# Patient Record
Sex: FEMALE | Race: WHITE
[De-identification: names, ages, dates, MRNs, and addresses within clinical notes are randomized per-mention and may not be internally consistent; named-entity substitution may affect disease eponyms.]

---

## 2022-05-26 ENCOUNTER — NON-APPOINTMENT (OUTPATIENT)
Age: 57
End: 2022-05-26

## 2022-05-27 PROBLEM — Z00.00 ENCOUNTER FOR PREVENTIVE HEALTH EXAMINATION: Status: ACTIVE | Noted: 2022-05-27

## 2022-05-31 ENCOUNTER — TRANSCRIPTION ENCOUNTER (OUTPATIENT)
Age: 57
End: 2022-05-31

## 2022-05-31 ENCOUNTER — APPOINTMENT (OUTPATIENT)
Dept: NEUROSURGERY | Facility: CLINIC | Age: 57
End: 2022-05-31
Payer: COMMERCIAL

## 2022-05-31 PROCEDURE — 99205 OFFICE O/P NEW HI 60 MIN: CPT

## 2022-05-31 NOTE — PHYSICAL EXAM
[General Appearance - Alert] : alert [General Appearance - In No Acute Distress] : in no acute distress [General Appearance - Well Nourished] : well nourished [Oriented To Time, Place, And Person] : oriented to person, place, and time [Impaired Insight] : insight and judgment were intact [Affect] : the affect was normal [Cranial Nerves Trigeminal (V)] : facial sensation intact symmetrically [Cranial Nerves Facial (VII)] : face symmetrical [Cranial Nerves Vestibulocochlear (VIII)] : hearing was intact bilaterally [Cranial Nerves Glossopharyngeal (IX)] : tongue and palate midline [Cranial Nerves Accessory (XI - Cranial And Spinal)] : head turning and shoulder shrug symmetric [Cranial Nerves Hypoglossal (XII)] : there was no tongue deviation with protrusion [Motor Tone] : muscle tone was normal in all four extremities [Motor Strength] : muscle strength was normal in all four extremities [Sensation Tactile Decrease] : light touch was intact [Abnormal Walk] : normal gait [Balance] : balance was intact [Cranial Nerves Optic (II)] : visual acuity intact bilaterally,  pupils equal round and reactive to light [Cranial Nerves Oculomotor (III)] : extraocular motion intact [FreeTextEntry6] : Bilateral hand tremor

## 2022-05-31 NOTE — ASSESSMENT
[FreeTextEntry1] : Ms. Mendoza presents with the acute onset of right periorbital eye pain exacerbated with eye movement which resolved with methylprednisolone, and more recent onset of less severe, paroxysmal, self limited left periorbital pain. MRI brain/orbits with and without gadolinium 5/2/22 and MRA/V brain 5/17/22 reviewed independently by me demonstrate several subtle findings. There is a subcentimeter left intraconal orbital lesion that likely represents a varix of the left inferior orbital vein, but may represent a hemangioma. There is a tiny aneurysm versus infundibulum of the origin of the left ophthalmic artery. MRV suggests focal stenoses versus arachnoid granulations of bilateral transverse-sigmoid sinus junctions. \par \par Natural history discussed at length with the patient and her wife.  Alternative management strategies reviewed. I explained that diagnostic cerebral angiography may be able to offer more detailed and accurate information regarding the possible left intraorbital venous anomaly, the possible left ophthalmic artery origin infundibulum versus aneurysm, and the venous outflow at the level of the bilateral transverse-sigmoid junctions. I also explained that a reasonable approach would be to obtain surveillance MRI orbits with and without gadolinium, MRA brain in 6  months given normal visual acuity and absence of optic nerve pathology. She would prefer to proceed with diagnostic angiography.\par \par Risks including but not limited to bleeding, infection, vascular injury, life threatening hematoma, limb ischemia, need for surgical repair, renal failure, stroke, coma/death, weakness/paralysis, visual/sensory loss, loss of speech/language/cognitive/memory function, changes in personality/behavior, failure of procedure, need for additional procedures discussed. The patient and her wife understand the risks, benefits, alternatives, and they wish to proceed. As such, we will schedule her for diagnostic cerebral angiogram with conscious sedation in the near future. \par \par I have asked the patient and her wife to contact me for any symptomatic development or progression in the interim at which time we can obtain expedited follow up imaging.\par \par A total of 60  minutes were spent relative to this encounter.

## 2022-05-31 NOTE — HISTORY OF PRESENT ILLNESS
[de-identified] : Ms. Mendoza presents in neurosurgical consultation at the request of Dr. Casie Hernandez with her wife in attendance. She has PMHx of CAD on ASA, chronic left hand tremor, right hand tremor (began 6 months ago), endometriosis, depression, kidney stones, and hypercholesterolemia. In April, patient reports the sudden onset of right eye severe periorbital pain which awakened her from sleep. Pain had been constant and was exacerbated with eye movement. Patient was evaluated in the emergency room at [facility]. As per the patient, CT revealed no acute findings. We do not have these images. Denies loss of vision. She was then evaluated in the outpatient setting by ophthalmologist, Dr. Abraham Torre, who requested MRI orbits and MRA/V brain (detailed below). She was treated empirically for retrobulbar optic neuritis with methylprednisolone with resolution of symptoms. Of note, ophthalmologic evaluations under the direction of both Dr. Torre and Dr. Hernandez demonstrated no loss of visual acuity and no overt evidence for optic disc pathology or papilledema. \par \par Today patient states alleviation of right periorbital pain since completion of methylprednisolone. Reports occasional 'dullness', but no recurrence of initial pain symptoms. Patient endorses new onset of left "stabbing" periorbital pain, which lasts for approximately 20 seconds and is less severe than original right sided symptoms. This has occurred each of the last three days, and has been self limited, resolving without medication.\par \par Denies headaches, loss of vision, diplopia, numbness/tingling in upper and lower extremities, and gait disturbances.

## 2022-05-31 NOTE — DATA REVIEWED
[de-identified] : MRA BRAIN: 5/17/22: IMPRESSION: Impression: A 2 mm vascular protrusion arising from the left anterior \par intracranial internal carotid artery, which may represent volume averaging \par artifact or the infundibulum of the left ophthalmic artery but the possibility \par of a small aneurysm cannot be excluded. A follow-up CTA is recommended for \par further evaluation. \par   MRV BRAIN: 5/17/22: IMPRESSION: Impression: \par   \par 1. Narrowing at the junction of the bilateral transverse and sigmoid sinuses, \par which may be secondary to arachnoid granulations or stenosis. A follow-up MRI of \par the brain without and with contrast (IAC protocol) and may be performed for \par further evaluation. \par 2. The left inferior orbital vein appears slightly more prominent when compared \par to the right, which may corresponds to the finding seen on the MRI orbit \par examination.  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 Grossly unremarkable right orbit. If clinically indicated, a \par follow-up MRI in 6-12 months may be performed to assess for stability.

## 2022-06-22 ENCOUNTER — APPOINTMENT (OUTPATIENT)
Dept: NEUROLOGY | Facility: CLINIC | Age: 57
End: 2022-06-22
Payer: COMMERCIAL

## 2022-06-22 VITALS
DIASTOLIC BLOOD PRESSURE: 83 MMHG | WEIGHT: 150 LBS | BODY MASS INDEX: 25.61 KG/M2 | HEIGHT: 64 IN | HEART RATE: 60 BPM | SYSTOLIC BLOOD PRESSURE: 127 MMHG

## 2022-06-22 DIAGNOSIS — E78.5 HYPERLIPIDEMIA, UNSPECIFIED: ICD-10-CM

## 2022-06-22 DIAGNOSIS — E55.9 VITAMIN D DEFICIENCY, UNSPECIFIED: ICD-10-CM

## 2022-06-22 DIAGNOSIS — Z87.891 PERSONAL HISTORY OF NICOTINE DEPENDENCE: ICD-10-CM

## 2022-06-22 DIAGNOSIS — Z78.9 OTHER SPECIFIED HEALTH STATUS: ICD-10-CM

## 2022-06-22 DIAGNOSIS — Z82.0 FAMILY HISTORY OF EPILEPSY AND OTHER DISEASES OF THE NERVOUS SYSTEM: ICD-10-CM

## 2022-06-22 DIAGNOSIS — Z82.3 FAMILY HISTORY OF STROKE: ICD-10-CM

## 2022-06-22 DIAGNOSIS — F32.A DEPRESSION, UNSPECIFIED: ICD-10-CM

## 2022-06-22 PROCEDURE — 99204 OFFICE O/P NEW MOD 45 MIN: CPT

## 2022-06-22 NOTE — REASON FOR VISIT
[Consultation] : a consultation visit [FreeTextEntry1] : Pt states she has been experiencing memory issues, confusions, and hand tremors. She also states she has been experiencing numbness on her fingertips and extreme fatigue.

## 2022-06-22 NOTE — HISTORY OF PRESENT ILLNESS
[FreeTextEntry1] : Jeanna is a 57-year-old right-handed woman with a past medical history of CAD, endometriosis, depression, kidney stones, hypercholesterolemia. She was referred by Dr. Salazar. She has recent history of severe right eye pain with no papillitis on fundoscopic exam. \par \par MRI brain did not show pathology of right optic nerve. She did have some vascular abnormalities noted to left ophthalmic artery as well as narrowing of the bilateral transverse to sigmoid sinus. She will undergo an angiogram with Dr. Salazar for this. \par \par Today she presents with a myriad of complaints. The most important problem is memory loss. She noticed this about two months ago. She is more forgetful. She will go into a room and forget why she was there. She has left her glasses in the car. She will forget the date or what year it is. Her deficits are noticeable to her wife. The most scary experience for her is that she will have moments where she forgets who she is and where she is. She is completely disoriented sometimes and has no awareness of even what country she is in . Episodes last seconds. Once she was speaking with friends and suddenly realized she had no idea who they were. She has intermittent jerking of her extremities as well. Sometimes she has no memory of who she is.\par \par She has a tremor which is troublesome bilaterally. She feels her balance is off, her fingertips are numb and that swallowing water is difficult. She has spatial difficulty and sometimes hears things that are not there, such as a cat's cry. \par \par No known history of seizures. \par \par She does have a history of  severe depression. She received ECT, over 50 treatments, bilateral, and thinks it may have destroyed her memory. \par \par Of note, she lost both parents in the past two years. She was the primary caretaker for both. \par \par Allergies: \par none\par \par Surgeries: \par hysterectomy 2000\par \par Medications: \par wellbutrin 300 mg XR daily\par paroxetine 20 mg qd \par ranolazine 50 mg bid \par lipitor 20 mg daily \par valium, 5 mg prn anxiety\par \par Social History\par Lives with wife\par Not working currently \par Used to be a writer\par College degree: BA  -creative writing\par Vegan diet\par Drinks a glass of wine with dinner  \par Quit smoking in 20s \par Did psychedelics, marijuana in high school \par Drives \par No children\par \par Family History: \par Mother:  - Parkinsons Disease, dementia\par Father: stroke, quadruple bypass in forties, many men in family  of heart attack in forties\par Sister: none known \par \par \par

## 2022-06-22 NOTE — PHYSICAL EXAM
[FreeTextEntry1] : Mental Status: alert to month, off on the day by 5 days, knows the year. Able to calculate number of quarters in $2.25. Difficulty with serial sevens. Able to spell “world” backwards. 3/3 registration of 3 items, 3/3 recall at 3 minutes. anxious, slightly flattened affect. Speech fluent\par CN: visual acuity, VFF, blink to confrontation \par III, IV, VI, PERRL, EOMI \par V sensation normal to light touch, pinprick\par VII normal squint vs resistance, normal smile, face symmetric \par VIII: normal hearing  \par IX, X normal gag, symmetric palate, uvula raises midline \par XI normal shrug versus resistance and lateralization of head versus resistance \par XII tongue symmetric, normal strength, no tremor fasciculations \par Motor: full strength throughout, normal bulk and tone, no cog-wheeling.  Sustention and action tremor, L>R \par Sensory: normal to LT and vibration \par Reflexes \par Brachioradialis, biceps, triceps 1+ bilaterally, patella and ankles 2+ \par Plantar flexor response bilaterally \par Coordination: no dysmetria on FNF  \par Gait : normal balance and gait, able to toe, heel, difficulty with tandem \par Station: negative Romberg  \par \par

## 2022-06-22 NOTE — REVIEW OF SYSTEMS
[Confused or Disoriented] : confusion [Memory Lapses or Loss] : memory loss [Numbness] : numbness [Negative] : Heme/Lymph [de-identified] : Hand tremors, and numbness on fingertips

## 2022-06-22 NOTE — ASSESSMENT
[FreeTextEntry1] : Please get me a copy of your EKG - before starting propranolol for essential tremor\par Please get neuropsychological testing\par Please get labwork\par Please get routine and ambulatory EEG\par angiogram with Dr. Salazar\par \par RTC in 2 months

## 2022-06-22 NOTE — DISCUSSION/SUMMARY
[FreeTextEntry1] : Jeanna is a 57-year-old right handed woman with a history of depression (received numerous ECT treatments in the past) and hyperlipidemia who is presenting to clinic for evaluation of memory issues. She had severe right eye pain (treated with methylprednisone) without evidence of optic neuritis. Symptoms resolved but left eye discomfort with question of left ophthalmic artery abnormality and sinus narrowing on MRA (outside study). Scheduled for angiogram with Dr. Salazar. \par \par She reports feeling fatigued with memory issues. Perhaps most concerning, she has episodes where she will forget suddenly where she is or forget the names of the friends she is speaking to. These sudden episodes of disorientation last seconds. Unclear if these are dissociative episodes related to depression (history of extensive ECT, suffered loss of both parents in past 2 years, she was primary caretaker) or if she has true memory and attention deficits. She states that sometimes she forgets who she is. This is not typical of dementia or seizures and may be more typical of a dissociative state. Vegan diet with increased MCV on labs. Will get b12, thyroid function tests. Will do formal neurocognitve testing, rule out pseudodementia/MCI. Has features of essential tremor - this is bothersome, does spill food sometimes. Findings not consistent with Parkinsons. Handwriting is also worse. Would not start zonisamide given possible worsening mood and history of kidney stones. No topamax given history of kidney stones. Could give propranolol but it may worsen depression. Will get an EKG. She has taken it in the past for public speaking. Will do EEG. If all findings are negative, would recommend therapy/grief counseling, vitamin b complex qd.

## 2022-06-22 NOTE — DATA REVIEWED
[de-identified] : Previous Radiographic/Studies: \par MRA: MRA BRAIN: 5/17/22: IMPRESSION: Impression: A 2 mm vascular protrusion arising from the left anterior \par intracranial internal carotid artery, which may represent volume averaging \par artifact or the infundibulum of the left ophthalmic artery but the possibility \par of a small aneurysm cannot be excluded. A follow-up CTA is recommended for \par further evaluation. \par  MRV BRAIN: 5/17/22: IMPRESSION: Impression: \par  \par 1. Narrowing at the junction of the bilateral transverse and sigmoid sinuses, \par which may be secondary to arachnoid granulations or stenosis. A follow-up MRI of \par the brain without and with contrast (IAC protocol) and may be performed for \par further evaluation. \par 2. The left inferior orbital vein appears slightly more prominent when compared \par to the right, which may corresponds to the finding seen on the MRI orbit \par examination. 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 Grossly unremarkable right orbit. If clinically indicated, a \par follow-up MRI in 6-12 months may be performed to assess for stability  [de-identified] : 6/2022: hemoglobin 12.3, .6, creatinine 0.65, LFT WNL

## 2022-06-23 DIAGNOSIS — R41.9 UNSPECIFIED SYMPTOMS AND SIGNS INVOLVING COGNITIVE FUNCTIONS AND AWARENESS: ICD-10-CM

## 2022-06-23 DIAGNOSIS — E55.9 VITAMIN D DEFICIENCY, UNSPECIFIED: ICD-10-CM

## 2022-06-23 LAB
25(OH)D3 SERPL-MCNC: 13.9 NG/ML
TSH SERPL-ACNC: 1.33 UIU/ML
VIT B12 SERPL-MCNC: 223 PG/ML

## 2022-06-29 ENCOUNTER — RESULT REVIEW (OUTPATIENT)
Age: 57
End: 2022-06-29

## 2022-06-29 ENCOUNTER — APPOINTMENT (OUTPATIENT)
Dept: NEUROSURGERY | Facility: HOSPITAL | Age: 57
End: 2022-06-29

## 2022-06-29 DIAGNOSIS — G93.9 DISORDER OF BRAIN, UNSPECIFIED: ICD-10-CM

## 2022-06-29 PROCEDURE — ZZZZZ: CPT

## 2022-07-05 ENCOUNTER — APPOINTMENT (OUTPATIENT)
Dept: NEUROSURGERY | Facility: CLINIC | Age: 57
End: 2022-07-05

## 2022-07-05 PROCEDURE — 99215 OFFICE O/P EST HI 40 MIN: CPT

## 2022-07-05 NOTE — ASSESSMENT
[FreeTextEntry1] : Ms. Mendoza presents today status post diagnostic cerebral angiogram. Diagnostic angiogram 6/29/22 reviewed independently by me demonstrates no evidence for intraorbital vascular malformation, no evidence for aneurysm, and no evidence for hemodynamically significant venous sinus stenosis. This is essentially a normal diagnostic angiogram. \par \par While angiography was normal, I was unable to find a definitive angiographic correlate for the small intraorbital lesion that likely represents a venous anomaly, but may less likely represent a hemangioma. As such, I plan to obtain MRI brain and orbits with and without gadolinium in 1 year with an appointment to follow. If this next study is unchanged, then I do not think that scheduled surveillance imaging will be indicated at that point. \par \par I have asked the patient and her wife to contact me for any symptomatic development or progression in the interim at which time we can obtain expedited follow up imaging.\par \par A total of 45 minutes were spent relative to this encounter.\par \par  \par \par

## 2022-07-05 NOTE — HISTORY OF PRESENT ILLNESS
[FreeTextEntry1] : Ms. Mendoza presents status post diagnostic cerebral angiogram 6/29/22. She denies neurological symptoms. Previous notes and interval history reviewed. She denies reoccurrence of initial symptoms. Forgetfulness and hand tremors are being managed under the direction of Dr. Puente. Neurological examination remains normal.\par

## 2022-07-07 ENCOUNTER — APPOINTMENT (OUTPATIENT)
Dept: NEUROLOGY | Facility: CLINIC | Age: 57
End: 2022-07-07

## 2022-08-31 ENCOUNTER — APPOINTMENT (OUTPATIENT)
Dept: NEUROLOGY | Facility: CLINIC | Age: 57
End: 2022-08-31

## 2022-08-31 VITALS
DIASTOLIC BLOOD PRESSURE: 82 MMHG | SYSTOLIC BLOOD PRESSURE: 130 MMHG | OXYGEN SATURATION: 97 % | TEMPERATURE: 97.7 F | HEIGHT: 64 IN | BODY MASS INDEX: 25.61 KG/M2 | WEIGHT: 150 LBS

## 2022-08-31 DIAGNOSIS — Z98.890 OTHER SPECIFIED POSTPROCEDURAL STATES: ICD-10-CM

## 2022-08-31 PROCEDURE — 99215 OFFICE O/P EST HI 40 MIN: CPT

## 2022-08-31 NOTE — DATA REVIEWED
[de-identified] : Previous Radiographic/Studies: \par MRA: MRA BRAIN: 5/17/22: IMPRESSION: Impression: A 2 mm vascular protrusion arising from the left anterior \par intracranial internal carotid artery, which may represent volume averaging \par artifact or the infundibulum of the left ophthalmic artery but the possibility \par of a small aneurysm cannot be excluded. A follow-up CTA is recommended for \par further evaluation. \par  MRV BRAIN: 5/17/22: IMPRESSION: Impression: \par  \par 1. Narrowing at the junction of the bilateral transverse and sigmoid sinuses, \par which may be secondary to arachnoid granulations or stenosis. A follow-up MRI of \par the brain without and with contrast (IAC protocol) and may be performed for \par further evaluation. \par 2. The left inferior orbital vein appears slightly more prominent when compared \par to the right, which may corresponds to the finding seen on the MRI orbit \par examination. 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 Grossly unremarkable right orbit. If clinically indicated, a \par follow-up MRI in 6-12 months may be performed to assess for stability  [de-identified] : 6/2022: hemoglobin 12.3, .6, creatinine 0.65, LFT WNL  \par 6/2022: vitamin b12 223 low, vitamin D 13.9 low , tsh 1.33 \par EKG 6/2022: sinus bradycardia, CA interval 138, QRS 84 heart rate 56 \par

## 2022-08-31 NOTE — ASSESSMENT
[FreeTextEntry1] : Can take magnesium oxide 400 mg at night for insomnia\par \par Continue vitamin b12 - have levels checked, if they are normal \par (can do weekly until deficiency corrected and then monthly) or transition to po 1000 mcg qd \par \par Continue vitamin D - have levels checked, goal > 30 , ideal 50-60 - once level is good, transition to vitamin d 1000 \par units or 2000 untis daily. Don't want to take high dose vitamin d indefinitely, can get toxicity\par \par Will send propranolol 40 mg twice a day to pharmacy for tremor - if you get dizzy - stop it (you have low heart rate at baseline) \par \par RTC to clinic in four months to see Dr. Karla Puente

## 2022-08-31 NOTE — PHYSICAL EXAM
[FreeTextEntry1] : Mental Status:  Off on the day by 1. 3/3 registration of 3 items, 3/3 at 3 minutes. off on the day by 1. \par Speech fluent. Anxious affect. \par CN: visual acuity, VFF, blink to confrontation \par III, IV, VI, PERRL, EOMI \par V sensation normal to light touch, pinprick\par VII normal squint vs resistance, normal smile, face symmetric \par VIII: normal hearing  \par IX, X normal gag, symmetric palate, uvula raises midline \par XI normal shrug versus resistance and lateralization of head versus resistance \par XII tongue symmetric, normal strength, no tremor fasciculations \par Motor: full strength throughout, normal bulk and tone, no cog-wheeling.  Sustention and action tremor, L>R \par Poor handwriting, and spiral drawing with tremor \par Sensory: normal to LT  \par Coordination: no dysmetria on FNF  \par Gait : normal balance and gait \par \par \par

## 2022-08-31 NOTE — HISTORY OF PRESENT ILLNESS
[FreeTextEntry1] : Presenting to clinic with wife. Feels much better, cognitively sharper and moodwise after b12 repletion. Has a vegan diet. Receiving vitamin b12 injections once a week for ~2 months. Also taking high dose vitamin D. Overall mood is improved. Does not think she needs to cognitive testing at this time. \par \par Reports having bothersome tremor. Started in left hand and is now in right hand. It is interfering with life. Tries to eat soup and will spill. Cannot write anymore. Would like medication.  \par \par Medications: \par bupropion 300 mg XR daily\par paroxetine 20 mg qd \par ranolazine 50 mg bid \par atorvastatin 20 mg daily \par valium, 5 mg prn anxiety\par \par _______\par 2022 \par Jeanna is a 57-year-old right-handed woman with a past medical history of CAD, endometriosis, depression, kidney stones, hypercholesterolemia. She was referred by Dr. Salazar. She has recent history of severe right eye pain with no papillitis on fundoscopic exam. \par \par MRI brain did not show pathology of right optic nerve. She did have some vascular abnormalities noted to left ophthalmic nerve as well as narrowing of bilateral transverse and sigmoid sinuses. She will undergo an angiogram with Dr. Salazar for this. \par \par Today she presents with a myriad of complaints. The most important problem is memory loss. She noticed this about two months ago. She is more forgetful. She will go into a room and forget why she was there. She has left her glasses \par in the car. She will forget the date, or what year it is. Her deficits are noticeable to her wife. The most scary experience for her is that she will have moments where she forgets who she is and where she is. She is completely disoriented and sometimes and has no awareness of even what country she is in . Episodes last seconds. Once she was speaking with friends and suddenly realized she had no idea who they were. She has intermittent jerking of her extremities as well. \par \par She has a tremor which is troublesome bilaterally. She feels her balance is off, her fingertips are numb and that swallowing water is difficult. She has spatial difficulty and sometimes hears things that are not there, such as a cat's cry. \par \par No known history of seizures. \par \par She does have a history of  severe depression. She received ECT, over 50 treatments, bilateral, and thinks it may have destroyed her memory. \par \par Of note, she lost both parents in the past two years. She was the primary caretaker for both. \par \par Allergies: \par none\par \par Surgeries: \par hysterectomy \par \par Medications: \par wellbutrin 300 mg XR daily\par paroxetine 20 mg qd \par ranozaline 50 mg bid \par lipitor 20 mg daily \par valium, 5 mg prn anxiety\par \par Social History\par Lives with wife\par Not working currently \par Used to be a writer\par College degree: BA  -creative writing\par Vegan diet\par Drinks a glass of wine with dinner  \par Quit smoking in 20s \par Did psychedelics, marijuana in high school \par Drives \par No children\par \par Family History: \par Mother:  - Parkinsons Disease, dementia\par Father: stroke, quadruple bypass in forties, many men in family  of heart attack in forties\par Sister: none known \par \par \par

## 2022-08-31 NOTE — DISCUSSION/SUMMARY
[FreeTextEntry1] : Jeanna is a 57-year-old right handed woman with a history of depression (received numerous ECT treatments in the past) and hyperlipidemia who is presenting to clinic for evaluation of memory issues. She had severe right eye pain (treated with methylprednisone) without evidence of optic neuritis. Symptoms resolved but left eye discomfort with question of left ophthalmic artery abnormality and sinus narrowing on MRA (outside study). She had an angiogram 6/29/22 - this was normal. She had a small itnraorbital lesion on MRI that was not visualized on angiogram. Will repeat MRI brain/orbits w/wo in 1 year , rule out venous anomaly or hemangioma. \par \par She reports feeling fatigued with memory issues. Perhaps most concerning, she has episodes where she will forget suddenly where she is or forget the names of the friends she is speaking to. These sudden episodes of disorientation last seconds. Unclear if these are dissociative episodes related to depression (history of extensive ECT, suffered loss of both parents in past 2 years, she was primary caretaker) or if she has true memory and attention deficits. She states that sometimes she forgets who she is. This is not typical of dementia or seizures and may be more typical of a dissociative state. Vegan diet with increased MCV on labs. B12 was low  - she feels much better now that this is being repleted. Receiving IM injections. Also getting supplementation with vitamin D. \par \danyelle Has features of essential tremor - this is bothersome, does spill food sometimes. Findings not consistent with Parkinsons. Handwriting is also worse. Would not start zonisamide given possible worsening mood and history of kidney stones. 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 Warned that it may worsen depression. She has taken it in the past for public speaking. Requested ativan or valium for episodes of panic - discussed that the psychiatrist should prescribe this but there is no contraindication from neurologic standpoint - should not be taken daily, just prn as needed for panic. \par

## 2022-09-17 ENCOUNTER — RX RENEWAL (OUTPATIENT)
Age: 57
End: 2022-09-17

## 2022-12-13 ENCOUNTER — RX RENEWAL (OUTPATIENT)
Age: 57
End: 2022-12-13

## 2022-12-23 RX ORDER — CHROMIUM 200 MCG
25 MCG TABLET ORAL
Qty: 90 | Refills: 0 | Status: DISCONTINUED | COMMUNITY
Start: 2022-09-17 | End: 2022-12-23

## 2023-02-17 ENCOUNTER — APPOINTMENT (OUTPATIENT)
Dept: NEUROLOGY | Facility: CLINIC | Age: 58
End: 2023-02-17
Payer: COMMERCIAL

## 2023-02-17 VITALS
DIASTOLIC BLOOD PRESSURE: 78 MMHG | HEART RATE: 71 BPM | BODY MASS INDEX: 25.61 KG/M2 | WEIGHT: 150 LBS | SYSTOLIC BLOOD PRESSURE: 135 MMHG | HEIGHT: 64 IN

## 2023-02-17 DIAGNOSIS — R29.90 UNSPECIFIED SYMPTOMS AND SIGNS INVOLVING THE NERVOUS SYSTEM: ICD-10-CM

## 2023-02-17 DIAGNOSIS — G25.0 ESSENTIAL TREMOR: ICD-10-CM

## 2023-02-17 DIAGNOSIS — G47.00 INSOMNIA, UNSPECIFIED: ICD-10-CM

## 2023-02-17 PROCEDURE — 99215 OFFICE O/P EST HI 40 MIN: CPT

## 2023-02-17 RX ORDER — ERGOCALCIFEROL 1.25 MG/1
1.25 MG CAPSULE, LIQUID FILLED ORAL
Qty: 6 | Refills: 1 | Status: DISCONTINUED | COMMUNITY
Start: 2022-06-23 | End: 2023-02-17

## 2023-02-17 RX ORDER — MAGNESIUM OXIDE 241.3 MG/1000MG
400 TABLET ORAL
Qty: 30 | Refills: 0 | Status: ACTIVE | COMMUNITY
Start: 2023-02-17 | End: 1900-01-01

## 2023-02-17 RX ORDER — CHROMIUM 200 MCG
25 MCG TABLET ORAL
Qty: 90 | Refills: 3 | Status: ACTIVE | COMMUNITY
Start: 2023-02-17 | End: 1900-01-01

## 2023-02-17 RX ORDER — CHLORHEXIDINE GLUCONATE 4 %
1000 LIQUID (ML) TOPICAL
Qty: 30 | Refills: 5 | Status: DISCONTINUED | COMMUNITY
Start: 2022-12-23 | End: 2023-02-17

## 2023-02-17 RX ORDER — OMEGA-3/DHA/EPA/FISH OIL 35-113.5MG
1000 TABLET,CHEWABLE ORAL
Qty: 30 | Refills: 5 | Status: DISCONTINUED | COMMUNITY
Start: 2022-06-23 | End: 2023-02-17

## 2023-02-17 RX ORDER — MULTIVIT-MIN/FOLIC/VIT K/LYCOP 400-300MCG
25 MCG TABLET ORAL
Qty: 30 | Refills: 2 | Status: DISCONTINUED | COMMUNITY
Start: 2022-12-13 | End: 2023-02-17

## 2023-02-17 NOTE — HISTORY OF PRESENT ILLNESS
[FreeTextEntry1] : Last seen in clinic on 22. Doing well. Didn't do cognitive testing as she is feeling better. Thinks that most of cognitive issues had to do with losing parents/grief. She remembers feeling incredibly overwhelmed. Has a psychologist and psychiatrist (remote treatment from [place]). She was taking weekly injection of vitamin b12 and now has stopped. Will recheck levels with primary care. Not taking po supplement. No longer taking vitamin D. Takes propranolol for tremor only intermittently because it makes her tired. \par \par Current medications\par bupropion  mg daily\par paxil 20 mg daily\par ranolazine 50 mg bid\par atorvastatin\par valium 5 mg prn \par propranol  \par _______\par 22 \par Presenting to clinic with wife. Feels much better, cognitively sharper and moodwise after b12 repletion. Has a vegan diet. Receiving vitamin b12 injections once a week for ~2 months. Also taking high dose vitamin D. Overall mood is improved. Does not think she needs to cognitive testing at this time. \par \par Reports having bothersome tremor. Started in left hand and is now in right hand. It is interfering with life. Tries to eat soup and will spill. Cannot write anymore. Would like medication.  \par \par Medications: \par bupropion 300 mg XR daily\par paroxetine 20 mg qd \par ranolazine 50 mg bid \par atorvastatin 20 mg daily \par valium, 5 mg prn anxiety\par \par _______\par 2022 \par Jeanna is a 57-year-old right-handed woman with a past medical history of CAD, endometriosis, depression, kidney stones, hypercholesterolemia. She was referred by Dr. Salazar. She has recent history of severe right eye pain with no papillitis on fundoscopic exam. \par \par MRI brain did not show pathology of right optic nerve. She did have some vascular abnormalities noted to left ophthalmic nerve as well as narrowing of bilateral transverse and sigmoid sinuses. She will undergo an angiogram with Dr. Salazar for this. \par \par Today she presents with a myriad of complaints. The most important problem is memory loss. She noticed this about two months ago. She is more forgetful. She will go into a room and forget why she was there. She has left her glasses \par in the car. She will forget the date, or what year it is. Her deficits are noticeable to her wife. The most scary experience for her is that she will have moments where she forgets who she is and where she is. She is completely disoriented and sometimes and has no awareness of even what country she is in . Episodes last seconds. Once she was speaking with friends and suddenly realized she had no idea who they were. She has intermittent jerking of her extremities as well. \par \par She has a tremor which is troublesome bilaterally. She feels her balance is off, her fingertips are numb and that swallowing water is difficult. She has spatial difficulty and sometimes hears things that are not there, such as a cat's cry. \par \par No known history of seizures. \par \par She does have a history of  severe depression. She received ECT, over 50 treatments, bilateral, and thinks it may have destroyed her memory. \par \par Of note, she lost both parents in the past two years. She was the primary caretaker for both. \par \par Allergies: \par none\par \par Surgeries: \par hysterectomy \par \par Medications: \par wellbutrin 300 mg XR daily\par paroxetine 20 mg qd \par ranozaline 50 mg bid \par lipitor 20 mg daily \par valium, 5 mg prn anxiety\par \par Social History\par Lives with wife\par Not working currently \par Used to be a writer\par College degree: BA  -creative writing\par Vegan diet\par Drinks a glass of wine with dinner  \par Quit smoking in 20s \par Did psychedelics, marijuana in high school \par Drives \par No children\par \par Family History: \par Mother:  - Parkinsons Disease, dementia\par Father: stroke, quadruple bypass in forties, many men in family  of heart attack in forties\par Sister: none known \par \par \par

## 2023-02-17 NOTE — PHYSICAL EXAM
[FreeTextEntry1] : \par Mental Status: Knows the month, off on the day by 3, knows the year. Able to do serial sevens. Able to spell "world" backwards.  3/3 registration of 3 items, 3/3 recall at three minutes. \par Speech fluent. Anxious/flat affect. \par CN: visual acuity, VFF, blink to confrontation \par III, IV, VI, PERRL, EOMI \par V sensation normal to light touch, pinprick\par VII normal squint vs resistance, normal smile, face symmetric \par VIII: normal hearing  \par IX, X normal gag, symmetric palate, uvula raises midline \par XI normal shrug versus resistance and lateralization of head versus resistance \par XII tongue symmetric, normal strength, no tremor fasciculations \par Motor: full strength throughout, normal bulk and tone.  Sustention and action tremor\par Sensory: normal to LT  \par Coordination: no dysmetria on FNF  \par Gait : normal balance and gait \par \par \par

## 2023-02-17 NOTE — DISCUSSION/SUMMARY
[FreeTextEntry1] : Jeanna is a 57-year-old right handed woman with a history of depression (received numerous ECT treatments in the past) and hyperlipidemia who is presenting to clinic for evaluation of memory issues. She had severe right eye pain (treated with methylprednisone) without evidence of optic neuritis. Symptoms resolved but left eye discomfort with question of left ophthalmic artery abnormality and sinus narrowing on MRA (outside study). She had an angiogram 6/29/22 - this was normal. She had a small intraorbital lesion on MRI that was not visualized on angiogram. Will repeat MRI brain/orbits w/wo in 1 year , rule out venous anomaly or hemangioma. \par \par She initially reported feeling fatigued with memory issues. Perhaps most concerning, she has episodes where she will forget suddenly where she is or forget the names of the friends she is speaking to. These sudden episodes of disorientation last seconds. Unclear if these are dissociative episodes related to depression (history of extensive ECT, suffered loss of both parents in past 2 years, she was primary caretaker) or if she has true memory and attention deficits. She states that sometimes she forgets who she is. \par \par She feels much better now and thinks that this was more related to grief from loosing parents. Did get vitamin b12 supplementation for six months, IM - getting her levels checked soon. Not on po supplementation. Intermittently takes vitamin D. \par \par Has features of essential tremor - this is bothersome, does spill food sometimes. Findings not consistent with Parkinsons. Handwriting is also worse. Would not start zonisamide given possible worsening mood and history of kidney stones. 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 Warned that it may worsen depression. She has taken it in the past for public speaking. Only takes it intermittently because it makes her tired. Symptoms are not too progressive or bothersome. \par \par \par \par  \par

## 2023-02-17 NOTE — ASSESSMENT
[FreeTextEntry1] : Continue propranolol as needed\par continue vitamin D 1000 units\par follow-up B12 levels with primary care, supplement as needed - goal level >400 \par can take magnesium as needed for sleep/insomnia \par RTC prn

## 2023-02-17 NOTE — DATA REVIEWED
[de-identified] : Previous Radiographic/Studies: \par MRA: MRA BRAIN: 5/17/22: IMPRESSION: Impression: A 2 mm vascular protrusion arising from the left anterior \par intracranial internal carotid artery, which may represent volume averaging \par artifact or the infundibulum of the left ophthalmic artery but the possibility \par of a small aneurysm cannot be excluded. A follow-up CTA is recommended for \par further evaluation. \par  MRV BRAIN: 5/17/22: IMPRESSION: Impression: \par  \par 1. Narrowing at the junction of the bilateral transverse and sigmoid sinuses, \par which may be secondary to arachnoid granulations or stenosis. A follow-up MRI of \par the brain without and with contrast (IAC protocol) and may be performed for \par further evaluation. \par 2. The left inferior orbital vein appears slightly more prominent when compared \par to the right, which may corresponds to the finding seen on the MRI orbit \par examination. 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 Grossly unremarkable right orbit. If clinically indicated, a \par follow-up MRI in 6-12 months may be performed to assess for stability  [de-identified] : 6/2022: hemoglobin 12.3, .6, creatinine 0.65, LFT WNL  \par 6/2022: vitamin b12 223 low, vitamin D 13.9 low , tsh 1.33 \par EKG 6/2022: sinus bradycardia, OK interval 138, QRS 84 heart rate 56 \par

## 2023-03-29 ENCOUNTER — RX RENEWAL (OUTPATIENT)
Age: 58
End: 2023-03-29

## 2023-03-29 DIAGNOSIS — R51.9 HEADACHE, UNSPECIFIED: ICD-10-CM

## 2023-03-29 RX ORDER — CHLORHEXIDINE GLUCONATE 4 %
400 (240 MG) LIQUID (ML) TOPICAL
Qty: 90 | Refills: 3 | Status: ACTIVE | COMMUNITY
Start: 2023-03-29 | End: 1900-01-01

## 2023-06-05 ENCOUNTER — RX RENEWAL (OUTPATIENT)
Age: 58
End: 2023-06-05

## 2023-06-05 RX ORDER — PROPRANOLOL HYDROCHLORIDE 40 MG/1
40 TABLET ORAL
Qty: 60 | Refills: 2 | Status: ACTIVE | COMMUNITY
Start: 2022-08-31 | End: 1900-01-01

## 2023-06-06 PROBLEM — G93.9 BRAIN LESION: Status: ACTIVE | Noted: 2023-06-06

## 2023-06-25 ENCOUNTER — RESULT REVIEW (OUTPATIENT)
Age: 58
End: 2023-06-25

## 2023-06-30 ENCOUNTER — NON-APPOINTMENT (OUTPATIENT)
Age: 58
End: 2023-06-30

## 2023-08-08 ENCOUNTER — APPOINTMENT (OUTPATIENT)
Dept: NEUROSURGERY | Facility: CLINIC | Age: 58
End: 2023-08-08
Payer: COMMERCIAL

## 2023-08-08 PROCEDURE — 99215 OFFICE O/P EST HI 40 MIN: CPT | Mod: 95

## 2023-08-08 NOTE — ASSESSMENT
[FreeTextEntry1] : Ms. Mendoza presents with MRI brain and orbits with and without gadolinium 6/26/23 reviewed independently by me which is stable when compared to directly to MRI performed 5/2022. There is no overt evidence for intraorbital pathology, and therefore neurosurgical intervention and scheduled surveillance imaging are not indicated at this time.  Of course, I have asked the patient to contact me for any symptomatic development or progression at which time we can obtain expedited follow up imaging and I can see her in clinical follow up. I have also asked her to contact me for any additional questions or concerns that arise.   A total of 45 minutes were spent relative to this encounter.

## 2023-08-08 NOTE — DATA REVIEWED
[de-identified] : MRI BRAIN AND ORBITS WITH AND WITHOUT CONTRAST: 6/26/23: IMPRESSION- Partially motion degraded study. Within this limitation, no evidence of an intraorbital mass lesion.   No acute intracranial hemorrhage, acute infarction, extra-axial fluid collection or hydrocephalus.

## 2023-08-08 NOTE — HISTORY OF PRESENT ILLNESS
[FreeTextEntry1] : Ms. Mendoza has agreed to two-way audiovisual telehealth consultation. She is located at her home [address] [place] [address] and I am located at my office at [facility].  Ms. Mendoza presents in neurosurgical follow up. Previous notes and interval history reviewed. Surveillance MRI detailed below.